# Patient Record
Sex: FEMALE | HISPANIC OR LATINO | Employment: OTHER | URBAN - NONMETROPOLITAN AREA
[De-identification: names, ages, dates, MRNs, and addresses within clinical notes are randomized per-mention and may not be internally consistent; named-entity substitution may affect disease eponyms.]

---

## 2017-10-18 ENCOUNTER — OFFICE VISIT (OUTPATIENT)
Dept: PODIATRY | Facility: CLINIC | Age: 37
End: 2017-10-18

## 2017-10-18 VITALS
SYSTOLIC BLOOD PRESSURE: 138 MMHG | OXYGEN SATURATION: 94 % | HEIGHT: 67 IN | BODY MASS INDEX: 41.28 KG/M2 | DIASTOLIC BLOOD PRESSURE: 94 MMHG | HEART RATE: 95 BPM | WEIGHT: 263 LBS

## 2017-10-18 DIAGNOSIS — M19.071 OSTEOARTHRITIS OF RIGHT ANKLE OR FOOT: ICD-10-CM

## 2017-10-18 DIAGNOSIS — Z72.0 TOBACCO ABUSE: ICD-10-CM

## 2017-10-18 DIAGNOSIS — M79.671 RIGHT FOOT PAIN: ICD-10-CM

## 2017-10-18 DIAGNOSIS — M25.374 SUBTALAR JOINT INSTABILITY, RIGHT: ICD-10-CM

## 2017-10-18 DIAGNOSIS — Z98.1 H/O ANKLE FUSION: Primary | ICD-10-CM

## 2017-10-18 DIAGNOSIS — M19.071: ICD-10-CM

## 2017-10-18 PROCEDURE — 99406 BEHAV CHNG SMOKING 3-10 MIN: CPT | Performed by: PODIATRIST

## 2017-10-18 PROCEDURE — 99214 OFFICE O/P EST MOD 30 MIN: CPT | Performed by: PODIATRIST

## 2017-10-18 PROCEDURE — 20605 DRAIN/INJ JOINT/BURSA W/O US: CPT | Performed by: PODIATRIST

## 2017-10-18 RX ORDER — GABAPENTIN 300 MG/1
300 CAPSULE ORAL 3 TIMES DAILY
Qty: 90 CAPSULE | Refills: 0 | Status: SHIPPED | OUTPATIENT
Start: 2017-10-18 | End: 2018-03-14 | Stop reason: SDUPTHER

## 2017-10-18 RX ORDER — CARISOPRODOL 350 MG/1
350 TABLET ORAL 3 TIMES DAILY PRN
Qty: 90 TABLET | Refills: 0 | Status: SHIPPED | OUTPATIENT
Start: 2017-10-18 | End: 2017-12-13 | Stop reason: SDUPTHER

## 2017-10-18 RX ORDER — BUPIVACAINE HYDROCHLORIDE 5 MG/ML
5 INJECTION, SOLUTION EPIDURAL; INTRACAUDAL ONCE
Status: COMPLETED | OUTPATIENT
Start: 2017-10-18 | End: 2017-10-18

## 2017-10-18 RX ORDER — DEXAMETHASONE SODIUM PHOSPHATE 4 MG/ML
4 INJECTION, SOLUTION INTRA-ARTICULAR; INTRALESIONAL; INTRAMUSCULAR; INTRAVENOUS; SOFT TISSUE ONCE
Status: COMPLETED | OUTPATIENT
Start: 2017-10-18 | End: 2017-10-18

## 2017-10-18 RX ADMIN — BUPIVACAINE HYDROCHLORIDE 1 ML: 5 INJECTION, SOLUTION EPIDURAL; INTRACAUDAL at 13:43

## 2017-10-18 RX ADMIN — DEXAMETHASONE SODIUM PHOSPHATE 4 MG: 4 INJECTION, SOLUTION INTRA-ARTICULAR; INTRALESIONAL; INTRAMUSCULAR; INTRAVENOUS; SOFT TISSUE at 13:43

## 2017-10-18 NOTE — PROGRESS NOTES
Karla Collins  1980  37 y.o. female     Patient presents today with a complaint of right foot/ankle pain.    10/18/2017  Chief Complaint   Patient presents with   • Right Foot - Pain           History of Present Illness    Karla Collins is a 37 y.o.female who presents to clinic today for follow-up of her right foot pain.  Patient has a complicated and significant past surgical history including right ankle arthrodesis with lateral malleolar take down.  She continues to have pain and instability to her right lower extremity.  She is currently ambulating in an AFO which does not help.  She is not taking anything for pain.  Pain is aggravated with prolonged weightbearing and relieved stress.  She denies any new trauma or injuries.  She also complains of numbness, burning and tingling to the foot.          Past Medical History:   Diagnosis Date   • Abdominal pain    • Acquired deformities     of ankle and foot   • Acute bronchitis     unspecified   • Backache    • Benign neoplasm     of skin of eyelid -EIC RLL margin   • Borderline glaucoma    • Cellulitis     right foot cellulitis   • Chronic back pain    • Chronic bronchitis    • Chronic obstructive lung disease     with asthma   • Chronic pain    • COAG (chronic open-angle glaucoma)     bleeding tests abnormal - blood clot      • Cyst of eye     eyelid   • Degenerative joint disease of ankle or foot    • Depressive disorder    • Diabetes mellitus     screening   • Difficulty walking    • Edema    • Examination     Individual health examination      • Folliculitis    • Foot drop    • Foot pain    • Foreign body of skin of lower extremity     Foreign body of skin of lower limb      • Fracture     ununited   • Gastroesophageal reflux disease    • Generalized anxiety disorder    • Hyperlipidemia    • Insomnia    • Localized primary osteoarthritis     of the ankle and /or foot   • Medication monitoring encounter    • Metatarsal bone fracture    • Morbid obesity    •  Myopia    • Obese    • Opioid dependence    • Osteoarthrosis     localized, primary, involving ankle and foot   • Otitis media    • Pain     in right foot   • Pain in limb    • Pes planus    • Postcalcaneal bursitis    • Radiculitis    • Sinus tarsi syndrome    • Spasm of back muscles    • Thromboembolism    • Tobacco dependence syndrome    • Tobacco user    • Venous thrombosis          Past Surgical History:   Procedure Laterality Date   • ABDOMINAL SURGERY  2009    L5 Vertebrectomy. Anterior fusion, L4 to sacrum. Local bone Mastercraft matrix and Infuse. Performed at [facility].)   • ANKLE SURGERY  2006    Arthrodesis of right ankle. Post traumatic degenerative joint disease    • BACK SURGERY  2009    (Posterior reduction of grade V spondylolisthesis and posteiror fusion with legacy instrumentation from L3 to the sacrum. Performed At [facility]   • BACK SURGERY      low back disk surgery x 2 most recent in 2009   •  SECTION      2003   • INJECTION OF MEDICATION  2015    kenalog(2) (Sinus tarsi syndrome)    • INJECTION OF MEDICATION  2015    Small Joint Injection/Aspiration  (Sinus tarsi syndrome   • OTHER SURGICAL HISTORY      Gastric band replacement, laparoscopic    • TUBAL ABDOMINAL LIGATION      (1)         Family History   Problem Relation Age of Onset   • Cancer Other    • Diabetes Other    • Heart disease Other    • Hypertension Other    • Stroke Other        Allergies   Allergen Reactions   • Aspirin    • Other      MRSA       Social History     Social History   • Marital status: Single     Spouse name: N/A   • Number of children: N/A   • Years of education: N/A     Occupational History   • Not on file.     Social History Main Topics   • Smoking status: Current Every Day Smoker   • Smokeless tobacco: Never Used   • Alcohol use No   • Drug use: No   • Sexual activity: Defer     Other Topics Concern   • Not on file     Social History Narrative  "        Current Outpatient Prescriptions   Medication Sig Dispense Refill   • albuterol (VENTOLIN HFA) 108 (90 BASE) MCG/ACT inhaler Inhale 2 puffs every 4 (four) hours as needed for wheezing. 8 g 11   • carisoprodol (SOMA) 250 MG tablet Take 1 tablet by mouth 3 (Three) Times a Day As Needed for muscle spasms. 30 tablet 0   • gabapentin (NEURONTIN) 300 MG capsule Take one tab morning and afternoon. Take 2 tabs before bedtime. 90 capsule 0   • mupirocin (BACTROBAN) 2 % cream Apply  topically 3 (three) times a day. 15 g 1   • carisoprodol (SOMA) 350 MG tablet Take 1 tablet by mouth 3 (Three) Times a Day As Needed for Muscle Spasms. 90 tablet 0   • gabapentin (NEURONTIN) 300 MG capsule Take 1 capsule by mouth 3 (Three) Times a Day. 90 capsule 0     Current Facility-Administered Medications   Medication Dose Route Frequency Provider Last Rate Last Dose   • bupivacaine (PF) (MARCAINE) 0.5 % injection 5 mL  5 mL Injection Once Paul Greco DPM       • dexamethasone (DECADRON) injection 4 mg  4 mg Intravenous Once Paul Greco DPM             OBJECTIVE    /94  Pulse 95  Ht 67\" (170.2 cm)  Wt 263 lb (119 kg)  SpO2 94%  BMI 41.19 kg/m2      Review of Systems   Constitutional: Negative for fatigue and fever.   HENT: Negative for hearing loss and nosebleeds.    Respiratory: Negative for cough, chest tightness and shortness of breath.    Cardiovascular: Negative for chest pain and leg swelling.   Gastrointestinal: Negative for diarrhea, nausea and vomiting.   Musculoskeletal:        Right lower extremity pain and instability     Skin: Negative.    Neurological: Positive for numbness. Negative for headaches.         Constitutional: well developed, well nourished    HEENT: Normocephalic and atraumatic, normal hearing    Respiratory: Non labored respirations noted    Cardiovascular:    DP/PT pulses palpable    CFT brisk  to all digits  Skin temp is warm to warm from proximal tibia to distal digits  Pedal hair " growth present.     Musculoskeletal:  Anterior muscle group strength 3/5. Posterior group 4/5  Gait abducted, apropulsive  Semi-rigid hammertoe deformity toes 2-5 on right  Absent ROM on right ankle  Severe valgus of hindfoot with subluxation of STJ and TN  Tenderness with STJ ROM  Absent PTT function    Dermatological:   Skin is warm, dry and intact    No subcutaneous nodules or masses noted    No open wounds noted     Neurological:   Sensation intact to light touch    DTR intact    Psychiatric: A&O x 3 with normal mood and affect. NAD.         STJ injection  Date/Time: 10/18/2017 5:16 PM  Consent given by: patient  Site marked: site marked  Timeout: Immediately prior to procedure a time out was called to verify the correct patient, procedure, equipment, support staff and site/side marked as required   Supporting Documentation  Indications: pain   Procedure Details  Location: right STJ.  Preparation: Patient was prepped and draped in the usual sterile fashion  Needle size: 25 G  Approach: anterolateral  Medication group details: 0.5% marcaine plain and decadron 4mg/mL.  Patient tolerance: patient tolerated the procedure well with no immediate complications              ASSESSMENT AND PLAN    Karla was seen today for pain.    Diagnoses and all orders for this visit:    H/O ankle fusion    Subtalar joint instability, right    Degenerative joint disease, ankle, foot, toe, right  -     bupivacaine (PF) (MARCAINE) 0.5 % injection 5 mL; Inject 5 mL as directed 1 (One) Time.  -     dexamethasone (DECADRON) injection 4 mg; Infuse 1 mL into a venous catheter 1 (One) Time.    Right foot pain    Osteoarthritis of right ankle or foot    Other orders  -     gabapentin (NEURONTIN) 300 MG capsule; Take 1 capsule by mouth 3 (Three) Times a Day.  -     carisoprodol (SOMA) 350 MG tablet; Take 1 tablet by mouth 3 (Three) Times a Day As Needed for Muscle Spasms.    - Comprehensive foot and ankle exam performed.   - Lengthy discussion  was held patient regarding her deformity and potential options for treatment.  Advised patient that she would have to stop smoking prior to consideration of surgical intervention.  She states understanding of this.  - We'll review all medical records in detail prior to next visit. Surgical plan will likely be for pantalar arthrodesis.   - Rx for Neurontin and Soma  - steroid injection for STJ  - I spent 5 minutes counseling this patient on tobacco cessation and the importance  - All questions were answered to the patients satisfaction.  - RTC in one month after cessation of smoking          This document has been electronically signed by Paul Greco DPM on October 18, 2017 5:09 PM     10/18/2017  5:09 PM

## 2017-12-04 ENCOUNTER — OFFICE VISIT (OUTPATIENT)
Dept: PODIATRY | Facility: CLINIC | Age: 37
End: 2017-12-04

## 2017-12-04 VITALS — HEART RATE: 85 BPM | HEIGHT: 67 IN | BODY MASS INDEX: 45.52 KG/M2 | WEIGHT: 290 LBS | OXYGEN SATURATION: 95 %

## 2017-12-04 DIAGNOSIS — Z98.1 H/O ANKLE FUSION: Primary | ICD-10-CM

## 2017-12-04 DIAGNOSIS — M19.071: ICD-10-CM

## 2017-12-04 DIAGNOSIS — M25.374 SUBTALAR JOINT INSTABILITY, RIGHT: ICD-10-CM

## 2017-12-04 PROCEDURE — 99214 OFFICE O/P EST MOD 30 MIN: CPT | Performed by: PODIATRIST

## 2017-12-04 NOTE — PROGRESS NOTES
Karla Collins  1980  37 y.o. female     Patient presents today for recheck of her right foot/ankle pain.    12/4/2017  Chief Complaint   Patient presents with   • Right Foot - Follow-up           History of Present Illness    Patient presents to clinic today for follow-up of her right foot and ankle deformity.  She had steroid injection in the subtalar joint on her last visit.  She states that it provided her with relief.  Patient relates that she hasn't smoked in 43 days.  She has no new pedal complaints today.          Past Medical History:   Diagnosis Date   • Abdominal pain    • Acquired deformities     of ankle and foot   • Acute bronchitis     unspecified   • Backache    • Benign neoplasm     of skin of eyelid -EIC RLL margin   • Borderline glaucoma    • Cellulitis     right foot cellulitis   • Chronic back pain    • Chronic bronchitis    • Chronic obstructive lung disease     with asthma   • Chronic pain    • COAG (chronic open-angle glaucoma)     bleeding tests abnormal - blood clot      • Cyst of eye     eyelid   • Degenerative joint disease of ankle or foot    • Depressive disorder    • Diabetes mellitus     screening   • Difficulty walking    • Edema    • Examination     Individual health examination      • Folliculitis    • Foot drop    • Foot pain    • Foreign body of skin of lower extremity     Foreign body of skin of lower limb      • Fracture     ununited   • Gastroesophageal reflux disease    • Generalized anxiety disorder    • Hyperlipidemia    • Insomnia    • Localized primary osteoarthritis     of the ankle and /or foot   • Medication monitoring encounter    • Metatarsal bone fracture    • Morbid obesity    • Myopia    • Obese    • Opioid dependence    • Osteoarthrosis     localized, primary, involving ankle and foot   • Otitis media    • Pain     in right foot   • Pain in limb    • Pes planus    • Postcalcaneal bursitis    • Radiculitis    • Sinus tarsi syndrome    • Spasm of back muscles     • Thromboembolism    • Tobacco dependence syndrome    • Tobacco user    • Venous thrombosis          Past Surgical History:   Procedure Laterality Date   • ABDOMINAL SURGERY  2009    L5 Vertebrectomy. Anterior fusion, L4 to sacrum. Local bone Mastercraft matrix and Infuse. Performed at [facility].)   • ANKLE SURGERY  2006    Arthrodesis of right ankle. Post traumatic degenerative joint disease    • BACK SURGERY  2009    (Posterior reduction of grade V spondylolisthesis and posteiror fusion with legacy instrumentation from L3 to the sacrum. Performed At [facility]   • BACK SURGERY      low back disk surgery x 2 most recent in 2009   •  SECTION      2003   • INJECTION OF MEDICATION  2015    kenalog(2) (Sinus tarsi syndrome)    • INJECTION OF MEDICATION  2015    Small Joint Injection/Aspiration  (Sinus tarsi syndrome   • OTHER SURGICAL HISTORY      Gastric band replacement, laparoscopic    • TUBAL ABDOMINAL LIGATION      (1)         Family History   Problem Relation Age of Onset   • Cancer Other    • Diabetes Other    • Heart disease Other    • Hypertension Other    • Stroke Other        Allergies   Allergen Reactions   • Aspirin    • Other      MRSA       Social History     Social History   • Marital status: Single     Spouse name: N/A   • Number of children: N/A   • Years of education: N/A     Occupational History   • Not on file.     Social History Main Topics   • Smoking status: Former Smoker   • Smokeless tobacco: Never Used   • Alcohol use No   • Drug use: No   • Sexual activity: Defer     Other Topics Concern   • Not on file     Social History Narrative         Current Outpatient Prescriptions   Medication Sig Dispense Refill   • albuterol (VENTOLIN HFA) 108 (90 BASE) MCG/ACT inhaler Inhale 2 puffs every 4 (four) hours as needed for wheezing. 8 g 11   • carisoprodol (SOMA) 250 MG tablet Take 1 tablet by mouth 3 (Three) Times a Day As Needed for  "muscle spasms. 30 tablet 0   • carisoprodol (SOMA) 350 MG tablet Take 1 tablet by mouth 3 (Three) Times a Day As Needed for Muscle Spasms. 90 tablet 0   • gabapentin (NEURONTIN) 300 MG capsule Take one tab morning and afternoon. Take 2 tabs before bedtime. 90 capsule 0   • gabapentin (NEURONTIN) 300 MG capsule Take 1 capsule by mouth 3 (Three) Times a Day. 90 capsule 0   • mupirocin (BACTROBAN) 2 % cream Apply  topically 3 (three) times a day. 15 g 1     No current facility-administered medications for this visit.          OBJECTIVE    Pulse 85  Ht 67\" (170.2 cm)  Wt 290 lb (132 kg)  SpO2 95%  BMI 45.42 kg/m2      Review of Systems   Constitutional: Negative for fatigue and fever.   Eyes: Negative.    Respiratory: Negative for cough, chest tightness and shortness of breath.    Cardiovascular: Negative for chest pain and leg swelling.   Gastrointestinal: Negative for diarrhea, nausea and vomiting.   Musculoskeletal:        Right lower extremity pain and instability     Skin: Negative.    Neurological: Positive for numbness. Negative for headaches.   Psychiatric/Behavioral: Negative.          Constitutional: well developed, well nourished    HEENT: Normocephalic and atraumatic, normal hearing    Respiratory: Non labored respirations noted    Cardiovascular:    DP/PT pulses palpable    CFT brisk  to all digits  Skin temp is warm to warm from proximal tibia to distal digits  Pedal hair growth present.     Musculoskeletal:  Anterior muscle group strength 3/5. Posterior group 4/5  Gait abducted, apropulsive  Semi-rigid hammertoe deformity toes 2-5 on right  Absent ROM on right ankle  Severe valgus of hindfoot with subluxation of STJ and TN  Tenderness with STJ ROM  Absent PTT function    Dermatological:   Skin is warm, dry and intact    No subcutaneous nodules or masses noted    No open wounds noted     Neurological:   Sensation intact to light touch    DTR intact    Psychiatric: A&O x 3 with normal mood and affect. " NAD.         Procedures        ASSESSMENT AND PLAN    Karla was seen today for follow-up.    Diagnoses and all orders for this visit:    H/O ankle fusion    Subtalar joint instability, right    Degenerative joint disease, ankle, foot, toe, right  -     CT ANKLE RIGHT WITHOUT CONTRAST; Future    - Ordered CT scan right ankle for surgical planning  - All questions were answered to the patients satisfaction.  - RTC after CT scan      I spent 30 minutes face-to-face with this patient discussing her pedal deformity and treatment.          This document has been electronically signed by Paul Greco DPM on December 4, 2017 2:24 PM     12/4/2017  2:24 PM

## 2017-12-11 ENCOUNTER — HOSPITAL ENCOUNTER (OUTPATIENT)
Dept: CT IMAGING | Facility: HOSPITAL | Age: 37
Discharge: HOME | End: 2017-12-11
Attending: PODIATRIST | Admitting: PODIATRIST

## 2017-12-11 DIAGNOSIS — M19.071: ICD-10-CM

## 2017-12-11 PROCEDURE — 73700 CT LOWER EXTREMITY W/O DYE: CPT

## 2017-12-13 ENCOUNTER — OFFICE VISIT (OUTPATIENT)
Dept: PODIATRY | Facility: CLINIC | Age: 37
End: 2017-12-13

## 2017-12-13 VITALS — HEART RATE: 89 BPM | WEIGHT: 290 LBS | BODY MASS INDEX: 45.52 KG/M2 | HEIGHT: 67 IN | OXYGEN SATURATION: 98 %

## 2017-12-13 DIAGNOSIS — M19.071: ICD-10-CM

## 2017-12-13 DIAGNOSIS — M25.374 SUBTALAR JOINT INSTABILITY, RIGHT: ICD-10-CM

## 2017-12-13 DIAGNOSIS — Z98.1 H/O ANKLE FUSION: Primary | ICD-10-CM

## 2017-12-13 PROCEDURE — 99213 OFFICE O/P EST LOW 20 MIN: CPT | Performed by: PODIATRIST

## 2017-12-13 RX ORDER — CARISOPRODOL 350 MG/1
350 TABLET ORAL 3 TIMES DAILY PRN
Qty: 90 TABLET | Refills: 0 | Status: SHIPPED | OUTPATIENT
Start: 2017-12-13 | End: 2018-03-14 | Stop reason: SDUPTHER

## 2017-12-13 RX ORDER — GABAPENTIN 300 MG/1
CAPSULE ORAL
Qty: 90 CAPSULE | Refills: 0 | Status: SHIPPED | OUTPATIENT
Start: 2017-12-13 | End: 2018-06-18 | Stop reason: SDUPTHER

## 2017-12-13 NOTE — PROGRESS NOTES
Karla Collins  1980  37 y.o. female     Patient presents today for recheck of her right foot/ankle pain and CT results.    12/13/2017  Chief Complaint   Patient presents with   • Right Ankle - Follow-up, CT results           History of Present Illness    Patient presents to clinic today for follow-up of her right foot and ankle deformity.  She Has had her CT scan.  She continues to complain of pain and instability in the right foot and ankle despite the use of an AFO.  She now states that the current AFO that she is using is rubbing in 2 different places.  She has no new pedal complaints.        Past Medical History:   Diagnosis Date   • Abdominal pain    • Acquired deformities     of ankle and foot   • Acute bronchitis     unspecified   • Backache    • Benign neoplasm     of skin of eyelid -EIC RLL margin   • Borderline glaucoma    • Cellulitis     right foot cellulitis   • Chronic back pain    • Chronic bronchitis    • Chronic obstructive lung disease     with asthma   • Chronic pain    • COAG (chronic open-angle glaucoma)     bleeding tests abnormal - blood clot      • Cyst of eye     eyelid   • Degenerative joint disease of ankle or foot    • Depressive disorder    • Diabetes mellitus     screening   • Difficulty walking    • Edema    • Examination     Individual health examination      • Folliculitis    • Foot drop    • Foot pain    • Foreign body of skin of lower extremity     Foreign body of skin of lower limb      • Fracture     ununited   • Gastroesophageal reflux disease    • Generalized anxiety disorder    • Hyperlipidemia    • Insomnia    • Localized primary osteoarthritis     of the ankle and /or foot   • Medication monitoring encounter    • Metatarsal bone fracture    • Morbid obesity    • Myopia    • Obese    • Opioid dependence    • Osteoarthrosis     localized, primary, involving ankle and foot   • Otitis media    • Pain     in right foot   • Pain in limb    • Pes planus    • Postcalcaneal  bursitis    • Radiculitis    • Sinus tarsi syndrome    • Spasm of back muscles    • Thromboembolism    • Tobacco dependence syndrome    • Tobacco user    • Venous thrombosis          Past Surgical History:   Procedure Laterality Date   • ABDOMINAL SURGERY  2009    L5 Vertebrectomy. Anterior fusion, L4 to sacrum. Local bone Mastercraft matrix and Infuse. Performed at [facility].)   • ANKLE SURGERY  2006    Arthrodesis of right ankle. Post traumatic degenerative joint disease    • BACK SURGERY  2009    (Posterior reduction of grade V spondylolisthesis and posteiror fusion with legacy instrumentation from L3 to the sacrum. Performed At [facility]   • BACK SURGERY      low back disk surgery x 2 most recent in 2009   •  SECTION      2003   • INJECTION OF MEDICATION  2015    kenalog(2) (Sinus tarsi syndrome)    • INJECTION OF MEDICATION  2015    Small Joint Injection/Aspiration  (Sinus tarsi syndrome   • OTHER SURGICAL HISTORY      Gastric band replacement, laparoscopic    • TUBAL ABDOMINAL LIGATION      (1)         Family History   Problem Relation Age of Onset   • Cancer Other    • Diabetes Other    • Heart disease Other    • Hypertension Other    • Stroke Other        Allergies   Allergen Reactions   • Aspirin    • Other      MRSA       Social History     Social History   • Marital status: Single     Spouse name: N/A   • Number of children: N/A   • Years of education: N/A     Occupational History   • Not on file.     Social History Main Topics   • Smoking status: Former Smoker   • Smokeless tobacco: Never Used   • Alcohol use No   • Drug use: No   • Sexual activity: Defer     Other Topics Concern   • Not on file     Social History Narrative         Current Outpatient Prescriptions   Medication Sig Dispense Refill   • albuterol (VENTOLIN HFA) 108 (90 BASE) MCG/ACT inhaler Inhale 2 puffs every 4 (four) hours as needed for wheezing. 8 g 11   • carisoprodol  "(SOMA) 350 MG tablet Take 1 tablet by mouth 3 (Three) Times a Day As Needed for Muscle Spasms. 90 tablet 0   • gabapentin (NEURONTIN) 300 MG capsule Take 1 capsule by mouth 3 (Three) Times a Day. 90 capsule 0   • gabapentin (NEURONTIN) 300 MG capsule Take one tab morning and afternoon. Take 2 tabs before bedtime. 90 capsule 0   • mupirocin (BACTROBAN) 2 % cream Apply  topically 3 (three) times a day. 15 g 1     No current facility-administered medications for this visit.          OBJECTIVE    Pulse 89  Ht 170.2 cm (67\")  Wt 132 kg (290 lb)  SpO2 98%  BMI 45.42 kg/m2      Review of Systems   Constitutional: Negative for fatigue and fever.   Eyes: Negative.    Respiratory: Negative for cough, chest tightness and shortness of breath.    Cardiovascular: Negative for chest pain and leg swelling.   Gastrointestinal: Negative for diarrhea, nausea and vomiting.   Musculoskeletal:        Right lower extremity pain and instability     Skin: Negative.    Neurological: Positive for numbness. Negative for headaches.   Psychiatric/Behavioral:        Depression         Constitutional: well developed, well nourished    HEENT: Normocephalic and atraumatic, normal hearing    Respiratory: Non labored respirations noted    Cardiovascular:    DP/PT pulses palpable    CFT brisk  to all digits  Skin temp is warm to warm from proximal tibia to distal digits  Pedal hair growth present.     Musculoskeletal:  Anterior muscle group strength 3/5. Posterior group 4/5  Gait abducted, apropulsive  Semi-rigid hammertoe deformity toes 2-5 on right  Absent ROM on right ankle  Severe valgus of hindfoot with subluxation of STJ and TN  Tenderness with STJ ROM  Absent PTT function    Dermatological:   Skin is warm, dry and intact    No subcutaneous nodules or masses noted    No open wounds noted   Abrasions noted to the medial ankle and plantar forefoot    Neurological:   Sensation intact to light touch    DTR intact    Psychiatric: A&O x 3 with " normal mood and affect. NAD.         Procedures        ASSESSMENT AND PLAN    Karla was seen today for follow-up and ct results.    Diagnoses and all orders for this visit:    H/O ankle fusion    Subtalar joint instability, right    Degenerative joint disease, ankle, foot, toe, right    Other orders  -     gabapentin (NEURONTIN) 300 MG capsule; Take one tab morning and afternoon. Take 2 tabs before bedtime.  -     carisoprodol (SOMA) 350 MG tablet; Take 1 tablet by mouth 3 (Three) Times a Day As Needed for Muscle Spasms.    - CT scan reviewed.  - Rx for [place] brace.  Current AFO that the patient has is insufficient for her deformity.  It is not providing adequate stability.  It is also rubbing in 2 different locations.  - All questions were answered to the patients satisfaction.  - RTC after obtaining new brace               This document has been electronically signed by Paul Greco DPM on December 13, 2017 4:31 PM     12/13/2017  4:31 PM

## 2018-03-14 RX ORDER — GABAPENTIN 300 MG/1
300 CAPSULE ORAL 3 TIMES DAILY
Qty: 90 CAPSULE | Refills: 0 | Status: SHIPPED | OUTPATIENT
Start: 2018-03-14 | End: 2018-05-21 | Stop reason: SDUPTHER

## 2018-03-14 RX ORDER — CARISOPRODOL 350 MG/1
350 TABLET ORAL 3 TIMES DAILY PRN
Qty: 90 TABLET | Refills: 0 | Status: SHIPPED | OUTPATIENT
Start: 2018-03-14 | End: 2018-05-21 | Stop reason: SDUPTHER

## 2018-05-15 ENCOUNTER — TELEPHONE (OUTPATIENT)
Dept: PODIATRY | Facility: CLINIC | Age: 38
End: 2018-05-15

## 2018-05-15 NOTE — TELEPHONE ENCOUNTER
Patient called and needs a refill gabapentin (NEURONTIN) 300 MG capsule     And a refill on carisoprodol (SOMA) 350 MG tablet     Said she takes them as needed not as prescribed.   And the last time she was seen is 12/13/2017

## 2018-05-16 NOTE — TELEPHONE ENCOUNTER
I can write once more. If patient needs to be on these medications long term then we will have to refer her to pain management.

## 2018-05-21 RX ORDER — GABAPENTIN 300 MG/1
300 CAPSULE ORAL 3 TIMES DAILY
Qty: 90 CAPSULE | Refills: 0 | Status: SHIPPED | OUTPATIENT
Start: 2018-05-21 | End: 2018-06-18 | Stop reason: SDUPTHER

## 2018-05-21 RX ORDER — CARISOPRODOL 350 MG/1
350 TABLET ORAL 3 TIMES DAILY PRN
Qty: 90 TABLET | Refills: 0 | Status: SHIPPED | OUTPATIENT
Start: 2018-05-21 | End: 2018-06-18 | Stop reason: SDUPTHER

## 2018-06-18 ENCOUNTER — OFFICE VISIT (OUTPATIENT)
Dept: PODIATRY | Facility: CLINIC | Age: 38
End: 2018-06-18

## 2018-06-18 VITALS — BODY MASS INDEX: 45.67 KG/M2 | HEIGHT: 67 IN | WEIGHT: 291.01 LBS

## 2018-06-18 DIAGNOSIS — M19.071 OSTEOARTHRITIS OF RIGHT ANKLE OR FOOT: Primary | ICD-10-CM

## 2018-06-18 DIAGNOSIS — M25.374 SUBTALAR JOINT INSTABILITY, RIGHT: ICD-10-CM

## 2018-06-18 PROCEDURE — 20605 DRAIN/INJ JOINT/BURSA W/O US: CPT | Performed by: PODIATRIST

## 2018-06-18 RX ORDER — DEXAMETHASONE SODIUM PHOSPHATE 4 MG/ML
4 INJECTION, SOLUTION INTRA-ARTICULAR; INTRALESIONAL; INTRAMUSCULAR; INTRAVENOUS; SOFT TISSUE ONCE
Status: COMPLETED | OUTPATIENT
Start: 2018-06-18 | End: 2018-06-18

## 2018-06-18 RX ORDER — BUPIVACAINE HYDROCHLORIDE 5 MG/ML
1 INJECTION, SOLUTION EPIDURAL; INTRACAUDAL ONCE
Status: COMPLETED | OUTPATIENT
Start: 2018-06-18 | End: 2018-06-18

## 2018-06-18 RX ORDER — CARISOPRODOL 350 MG/1
350 TABLET ORAL 3 TIMES DAILY PRN
Qty: 90 TABLET | Refills: 2 | Status: SHIPPED | OUTPATIENT
Start: 2018-06-18

## 2018-06-18 RX ORDER — GABAPENTIN 300 MG/1
300 CAPSULE ORAL 3 TIMES DAILY
Qty: 90 CAPSULE | Refills: 2 | Status: SHIPPED | OUTPATIENT
Start: 2018-06-18 | End: 2021-11-08

## 2018-06-18 RX ADMIN — BUPIVACAINE HYDROCHLORIDE 1 ML: 5 INJECTION, SOLUTION EPIDURAL; INTRACAUDAL at 10:53

## 2018-06-18 RX ADMIN — DEXAMETHASONE SODIUM PHOSPHATE 4 MG: 4 INJECTION, SOLUTION INTRA-ARTICULAR; INTRALESIONAL; INTRAMUSCULAR; INTRAVENOUS; SOFT TISSUE at 10:53

## 2018-06-18 NOTE — PROGRESS NOTES
Karla Collins  1980  37 y.o. female     Patient presents today for recheck of her right foot/ankle pain.    6/18/2018  Chief Complaint   Patient presents with   • Right Ankle - Follow-up       History of Present Illness    Patient presents to clinic today for follow-up of her right foot and ankle deformity.  She has had her new braces made.  However, she has to have the modified because they are uncomfortable.  She continues to complain of pain and instability to the right lower extremity.  She rates her pain as a 5 out of 10.  Patient states that she does not want to go to pain management for fear of them turning her into an addict.  She would prefer that her primary care doctor provided her prescriptions.  However, her primary care provider cannot write them at this time.  She'll be able to write them in September of this year.  She has no new pedal complaints.        Past Medical History:   Diagnosis Date   • Abdominal pain    • Acquired deformities     of ankle and foot   • Acute bronchitis     unspecified   • Backache    • Benign neoplasm     of skin of eyelid -EIC RLL margin   • Borderline glaucoma    • Cellulitis     right foot cellulitis   • Chronic back pain    • Chronic bronchitis    • Chronic obstructive lung disease     with asthma   • Chronic pain    • COAG (chronic open-angle glaucoma)     bleeding tests abnormal - blood clot      • Cyst of eye     eyelid   • Degenerative joint disease of ankle or foot    • Depressive disorder    • Diabetes mellitus     screening   • Difficulty walking    • Edema    • Examination     Individual health examination      • Folliculitis    • Foot drop    • Foot pain    • Foreign body of skin of lower extremity     Foreign body of skin of lower limb      • Fracture     ununited   • Gastroesophageal reflux disease    • Generalized anxiety disorder    • Hyperlipidemia    • Insomnia    • Localized primary osteoarthritis     of the ankle and /or foot   • Medication  monitoring encounter    • Metatarsal bone fracture    • Morbid obesity    • Myopia    • Obese    • Opioid dependence    • Osteoarthrosis     localized, primary, involving ankle and foot   • Otitis media    • Pain     in right foot   • Pain in limb    • Pes planus    • Postcalcaneal bursitis    • Radiculitis    • Sinus tarsi syndrome    • Spasm of back muscles    • Thromboembolism    • Tobacco dependence syndrome    • Tobacco user    • Venous thrombosis          Past Surgical History:   Procedure Laterality Date   • ABDOMINAL SURGERY  2009    L5 Vertebrectomy. Anterior fusion, L4 to sacrum. Local bone Mastercraft matrix and Infuse. Performed at [facility].)   • ANKLE SURGERY  2006    Arthrodesis of right ankle. Post traumatic degenerative joint disease    • BACK SURGERY  2009    (Posterior reduction of grade V spondylolisthesis and posteiror fusion with legacy instrumentation from L3 to the sacrum. Performed At [facility]   • BACK SURGERY      low back disk surgery x 2 most recent in 2009   •  SECTION      2003   • INJECTION OF MEDICATION  2015    kenalog(2) (Sinus tarsi syndrome)    • INJECTION OF MEDICATION  2015    Small Joint Injection/Aspiration  (Sinus tarsi syndrome   • OTHER SURGICAL HISTORY      Gastric band replacement, laparoscopic    • TUBAL ABDOMINAL LIGATION      (1)         Family History   Problem Relation Age of Onset   • Cancer Other    • Diabetes Other    • Heart disease Other    • Hypertension Other    • Stroke Other        Allergies   Allergen Reactions   • Aspirin    • Other      MRSA       Social History     Social History   • Marital status: Single     Spouse name: N/A   • Number of children: N/A   • Years of education: N/A     Occupational History   • Not on file.     Social History Main Topics   • Smoking status: Former Smoker   • Smokeless tobacco: Never Used   • Alcohol use No   • Drug use: No   • Sexual activity: Defer     Other  "Topics Concern   • Not on file     Social History Narrative   • No narrative on file         Current Outpatient Prescriptions   Medication Sig Dispense Refill   • albuterol (VENTOLIN HFA) 108 (90 BASE) MCG/ACT inhaler Inhale 2 puffs every 4 (four) hours as needed for wheezing. 8 g 11   • carisoprodol (SOMA) 350 MG tablet Take 1 tablet by mouth 3 (Three) Times a Day As Needed for Muscle Spasms. 90 tablet 2   • gabapentin (NEURONTIN) 300 MG capsule Take 1 capsule by mouth 3 (Three) Times a Day. 90 capsule 2   • mupirocin (BACTROBAN) 2 % cream Apply  topically 3 (three) times a day. 15 g 1     Current Facility-Administered Medications   Medication Dose Route Frequency Provider Last Rate Last Dose   • bupivacaine (PF) (MARCAINE) 0.5 % injection 1 mL  1 mL Injection Once Paul Greco DPM       • dexamethasone sodium phosphate injection 4 mg  4 mg Intramuscular Once Paul Greco DPM             OBJECTIVE    Ht 170.2 cm (67.01\")   Wt 132 kg (291 lb 0.1 oz)   BMI 45.57 kg/m²       Review of Systems   Constitutional: Negative for fatigue and fever.   Eyes: Negative.    Respiratory: Negative for cough, chest tightness and shortness of breath.    Cardiovascular: Negative for chest pain and leg swelling.   Gastrointestinal: Negative for diarrhea, nausea and vomiting.   Musculoskeletal:        Right lower extremity pain and instability     Skin: Negative.    Neurological: Positive for numbness. Negative for headaches.   Psychiatric/Behavioral:        Depression         Constitutional: well developed, well nourished    HEENT: Normocephalic and atraumatic, normal hearing    Respiratory: Non labored respirations noted    Cardiovascular:    DP/PT pulses palpable    CFT brisk  to all digits  Skin temp is warm to warm from proximal tibia to distal digits  Pedal hair growth present.     Musculoskeletal:  Anterior muscle group strength 3/5. Posterior group 4/5  Gait abducted, apropulsive  Semi-rigid hammertoe deformity toes 2-5 " on right  Absent ROM on right ankle  Severe valgus of hindfoot with subluxation of STJ and TN  Tenderness with STJ ROM  Absent PTT function    Dermatological:   Skin is warm, dry and intact    No subcutaneous nodules or masses noted    No open wounds noted   Abrasions noted to the medial ankle and plantar forefoot    Neurological:   Sensation intact to light touch    DTR intact    Psychiatric: A&O x 3 with normal mood and affect. NAD.         Procedures    STJ injection  Date/Time: 06/18/2018   Consent given by: patient  Site marked: site marked  Timeout: Immediately prior to procedure a time out was called to verify the correct patient, procedure, equipment, support staff and site/side marked as required   Supporting Documentation  Indications: pain   Procedure Details  Location: right STJ.  Preparation: Patient was prepped and draped in the usual sterile fashion  Needle size: 25 G  Approach: anterolateral  Medication group details: 0.5% marcaine plain and decadron 4mg/mL.  Patient tolerance: patient tolerated the procedure well with no immediate complications    ASSESSMENT AND PLAN    Karla was seen today for follow-up.    Diagnoses and all orders for this visit:    Osteoarthritis of right ankle or foot  -     bupivacaine (PF) (MARCAINE) 0.5 % injection 1 mL; Inject 1 mL as directed 1 (One) Time.  -     dexamethasone sodium phosphate injection 4 mg; Inject 1 mL into the shoulder, thigh, or buttocks 1 (One) Time.    Subtalar joint instability, right    Other orders  -     gabapentin (NEURONTIN) 300 MG capsule; Take 1 capsule by mouth 3 (Three) Times a Day.  -     carisoprodol (SOMA) 350 MG tablet; Take 1 tablet by mouth 3 (Three) Times a Day As Needed for Muscle Spasms.      - Steroid injection left STJ  - Refilled neruontin and soma until patients primary care provider take over  - All questions were answered to the patients satisfaction.  - RTC 6 months               This document has been electronically signed  by Paul Greco DPM on June 18, 2018 11:33 AM     6/18/2018  11:33 AM

## 2018-07-24 DIAGNOSIS — M54.5 LOW BACK PAIN, UNSPECIFIED BACK PAIN LATERALITY, UNSPECIFIED CHRONICITY, WITH SCIATICA PRESENCE UNSPECIFIED: Primary | ICD-10-CM

## 2018-07-25 ENCOUNTER — OFFICE VISIT (OUTPATIENT)
Dept: ORTHOPEDIC SURGERY | Facility: CLINIC | Age: 38
End: 2018-07-25

## 2018-07-25 VITALS — WEIGHT: 262 LBS | HEIGHT: 67 IN | BODY MASS INDEX: 41.12 KG/M2

## 2018-07-25 DIAGNOSIS — M54.5 LOW BACK PAIN, UNSPECIFIED BACK PAIN LATERALITY, UNSPECIFIED CHRONICITY, WITH SCIATICA PRESENCE UNSPECIFIED: Primary | ICD-10-CM

## 2018-07-25 PROCEDURE — 99213 OFFICE O/P EST LOW 20 MIN: CPT | Performed by: ORTHOPAEDIC SURGERY

## 2018-07-25 RX ORDER — ERGOCALCIFEROL 1.25 MG/1
50000 CAPSULE ORAL WEEKLY
COMMUNITY
End: 2021-11-08

## 2019-02-27 ENCOUNTER — OFFICE VISIT (OUTPATIENT)
Dept: PODIATRY | Facility: CLINIC | Age: 39
End: 2019-02-27

## 2019-02-27 VITALS — WEIGHT: 250 LBS | BODY MASS INDEX: 39.24 KG/M2 | HEART RATE: 101 BPM | OXYGEN SATURATION: 98 % | HEIGHT: 67 IN

## 2019-02-27 DIAGNOSIS — M25.374 SUBTALAR JOINT INSTABILITY, RIGHT: Primary | ICD-10-CM

## 2019-02-27 DIAGNOSIS — M19.071 OSTEOARTHRITIS OF RIGHT ANKLE OR FOOT: ICD-10-CM

## 2019-02-27 DIAGNOSIS — Z98.1 H/O ANKLE FUSION: ICD-10-CM

## 2019-02-27 DIAGNOSIS — M19.071: ICD-10-CM

## 2019-02-27 DIAGNOSIS — M79.671 RIGHT FOOT PAIN: ICD-10-CM

## 2019-02-27 PROCEDURE — 99213 OFFICE O/P EST LOW 20 MIN: CPT | Performed by: PODIATRIST

## 2019-02-27 NOTE — PROGRESS NOTES
Karla Collins  1980  38 y.o. female     Patient presents today with a complaint of bilateral ankle pain.    2/28/2019  Chief Complaint   Patient presents with   • Left Ankle - Follow-up, Pain   • Right Ankle - Follow-up, Pain       History of Present Illness    Patient presents to clinic today for follow-up of her right foot and ankle deformity.  She is ambulating in AFOs. She continues to complain of pain and instability to the right lower extremity.  She rates her pain as a 6 out of 10.  Patient states that her pain is somewhat controlled on herbal medications.  She has no new pedal complaints.        Past Medical History:   Diagnosis Date   • Abdominal pain    • Acquired deformities     of ankle and foot   • Acute bronchitis     unspecified   • Backache    • Benign neoplasm     of skin of eyelid -EIC RLL margin   • Borderline glaucoma    • Cellulitis     right foot cellulitis   • Chronic back pain    • Chronic bronchitis (CMS/HCC)    • Chronic obstructive lung disease (CMS/HCC)     with asthma   • Chronic pain    • COAG (chronic open-angle glaucoma)     bleeding tests abnormal - blood clot      • Cyst of eye     eyelid   • Degenerative joint disease of ankle or foot    • Depressive disorder    • Diabetes mellitus (CMS/HCC)     screening   • Difficulty walking    • Edema    • Examination     Individual health examination      • Folliculitis    • Foot drop    • Foot pain    • Foreign body of skin of lower extremity     Foreign body of skin of lower limb      • Fracture     ununited   • Gastroesophageal reflux disease    • Generalized anxiety disorder    • Hyperlipidemia    • Insomnia    • Localized primary osteoarthritis     of the ankle and /or foot   • Medication monitoring encounter    • Metatarsal bone fracture    • Morbid obesity (CMS/HCC)    • Myopia    • Obese    • Opioid dependence (CMS/HCC)    • Osteoarthrosis     localized, primary, involving ankle and foot   • Otitis media    • Pain     in right foot    • Pain in limb    • Pes planus    • Postcalcaneal bursitis    • Radiculitis    • Sinus tarsi syndrome    • Spasm of back muscles    • Thromboembolism (CMS/HCC)    • Tobacco dependence syndrome    • Tobacco user    • Venous thrombosis          Past Surgical History:   Procedure Laterality Date   • ABDOMINAL SURGERY  2009    L5 Vertebrectomy. Anterior fusion, L4 to sacrum. Local bone Mastercraft matrix and Infuse. Performed at [facility].)   • ANKLE SURGERY  2006    Arthrodesis of right ankle. Post traumatic degenerative joint disease    • BACK SURGERY  2009    (Posterior reduction of grade V spondylolisthesis and posteiror fusion with legacy instrumentation from L3 to the sacrum. Performed At [facility]   • BACK SURGERY      low back disk surgery x 2 most recent in 2009   •  SECTION      2003   • INJECTION OF MEDICATION  2015    kenalog(2) (Sinus tarsi syndrome)    • INJECTION OF MEDICATION  2015    Small Joint Injection/Aspiration  (Sinus tarsi syndrome   • OTHER SURGICAL HISTORY      Gastric band replacement, laparoscopic    • TUBAL ABDOMINAL LIGATION      (1)         Family History   Problem Relation Age of Onset   • Cancer Other    • Diabetes Other    • Heart disease Other    • Hypertension Other    • Stroke Other        Allergies   Allergen Reactions   • Aspirin    • Other      MRSA       Social History     Socioeconomic History   • Marital status: Single     Spouse name: Not on file   • Number of children: Not on file   • Years of education: Not on file   • Highest education level: Not on file   Social Needs   • Financial resource strain: Not on file   • Food insecurity - worry: Not on file   • Food insecurity - inability: Not on file   • Transportation needs - medical: Not on file   • Transportation needs - non-medical: Not on file   Occupational History   • Not on file   Tobacco Use   • Smoking status: Former Smoker   • Smokeless tobacco: Never  "Used   Substance and Sexual Activity   • Alcohol use: No   • Drug use: No   • Sexual activity: Defer   Other Topics Concern   • Not on file   Social History Narrative   • Not on file         Current Outpatient Medications   Medication Sig Dispense Refill   • albuterol (VENTOLIN HFA) 108 (90 BASE) MCG/ACT inhaler Inhale 2 puffs every 4 (four) hours as needed for wheezing. 8 g 11   • carisoprodol (SOMA) 350 MG tablet Take 1 tablet by mouth 3 (Three) Times a Day As Needed for Muscle Spasms. 90 tablet 2   • gabapentin (NEURONTIN) 300 MG capsule Take 1 capsule by mouth 3 (Three) Times a Day. 90 capsule 2   • vitamin D (ERGOCALCIFEROL) 10905 units capsule capsule Take 50,000 Units by mouth 1 (One) Time Per Week.       No current facility-administered medications for this visit.          OBJECTIVE    Pulse 101   Ht 170.2 cm (67\")   Wt 113 kg (250 lb)   SpO2 98%   BMI 39.16 kg/m²       Review of Systems   Constitutional: Negative.    HENT: Negative.    Eyes: Negative.    Respiratory: Negative.    Cardiovascular: Negative.    Gastrointestinal: Negative.    Genitourinary: Negative.    Musculoskeletal:        Bilateral ankle pain     Skin: Negative.    Neurological: Positive for numbness.   Psychiatric/Behavioral:        Depression       Physical Exam   Constitutional: She is oriented to person, place, and time. She appears well-developed and well-nourished. No distress.   HENT:   Head: Normocephalic and atraumatic.   Nose: Nose normal.   Eyes: Conjunctivae and EOM are normal. Pupils are equal, round, and reactive to light.   Pulmonary/Chest: Effort normal. No respiratory distress. She has no wheezes.   Neurological: She is alert and oriented to person, place, and time.   Skin: Skin is warm and dry. Capillary refill takes less than 2 seconds.   Psychiatric: She has a normal mood and affect. Her behavior is normal.   Vitals reviewed.    Lower Extremity:      Cardiovascular:    DP/PT pulses palpable    CFT brisk  to all " digits  Skin temp is warm to warm from proximal tibia to distal digits  Pedal hair growth present.     Musculoskeletal:  Anterior muscle group strength 3/5. Posterior group 4/5  Gait abducted, apropulsive  Semi-rigid hammertoe deformity toes 2-5 on right  Absent ROM on right ankle  Severe valgus of hindfoot with subluxation of STJ and TN  Tenderness with STJ ROM  Absent PTT function    Dermatological:   Skin is warm, dry and intact    No subcutaneous nodules or masses noted    No open wounds noted     Neurological:   Sensation intact to light touch          Procedures      ASSESSMENT AND PLAN    Karla was seen today for follow-up, pain, follow-up and pain.    Diagnoses and all orders for this visit:    Subtalar joint instability, right  -     Ambulatory Referral to Podiatry    Osteoarthritis of right ankle or foot  -     Ambulatory Referral to Podiatry    H/O ankle fusion  -     Ambulatory Referral to Podiatry    Degenerative joint disease, ankle, foot, toe, right  -     Ambulatory Referral to Podiatry    Right foot pain  -     Ambulatory Referral to Podiatry      - CT scan reviewed  - Rediscussed conservative and surgical treatment options with the patient in detail.  At this time I still recommend patient hold off on surgery as long as possible.  Ultimately she will need subtalar joint and talonavicular joint arthrodesis giving her a pantalar arthrodesis.  Recommended patient seek a second opinion.  - [facility]  - Continue custom AFOs.  We will consider PTBAFO in the future.   - All questions were answered to the patients satisfaction.  - Return to clinic as needed               This document has been electronically signed by Paul Greco DPM on February 28, 2019 5:39 PM     2/28/2019  5:39 PM

## 2020-07-14 ENCOUNTER — OFFICE VISIT (OUTPATIENT)
Dept: PODIATRY | Facility: CLINIC | Age: 40
End: 2020-07-14

## 2020-07-14 VITALS — HEIGHT: 67 IN | HEART RATE: 89 BPM | BODY MASS INDEX: 39.24 KG/M2 | WEIGHT: 250 LBS | OXYGEN SATURATION: 99 %

## 2020-07-14 DIAGNOSIS — Z98.1 H/O ANKLE FUSION: ICD-10-CM

## 2020-07-14 DIAGNOSIS — M25.571 ACUTE BILATERAL ANKLE PAIN: Primary | ICD-10-CM

## 2020-07-14 DIAGNOSIS — M25.374 SUBTALAR JOINT INSTABILITY, RIGHT: ICD-10-CM

## 2020-07-14 DIAGNOSIS — M25.572 ACUTE BILATERAL ANKLE PAIN: Primary | ICD-10-CM

## 2020-07-14 DIAGNOSIS — M19.071 OSTEOARTHRITIS OF RIGHT ANKLE OR FOOT: ICD-10-CM

## 2020-07-14 PROCEDURE — 99214 OFFICE O/P EST MOD 30 MIN: CPT | Performed by: PODIATRIST

## 2020-07-14 NOTE — PROGRESS NOTES
Karla Collins  1980  40 y.o. female     Patient presents today with a complaint of bilateral ankle pain. Xray obtained today     7/15/2020  Chief Complaint   Patient presents with   • Left Ankle - Pain   • Right Ankle - Pain       History of Present Illness    Pleasant 40-year-old female presents to clinic today for follow-up.  Is been approximately 1-1/2 years since her last visit.  On her last visit she requested second opinion for which she was provided a referral.  However, patient was not able to get second opinion.  Patient states that she had to move to [place] shortly after her last visit with myself.  She has recently moved back.  Continues to complain of pain and instability to the right ankle.  Does have history of ankle fusion several years ago by Dr. Moraes.  She does relate today to increasing pain to the left ankle and foot.  She does ambulate in bilateral custom AFOs.  These do help.  Currently she rates her pain as a 5 out of 10.  She remains interested in second opinion regarding treatment options for her right lower extremity.  No other complaints today.    Past Medical History:   Diagnosis Date   • Abdominal pain    • Acquired deformities     of ankle and foot   • Acute bronchitis     unspecified   • Backache    • Benign neoplasm     of skin of eyelid -EIC RLL margin   • Borderline glaucoma    • Cellulitis     right foot cellulitis   • Chronic back pain    • Chronic bronchitis (CMS/HCC)    • Chronic obstructive lung disease (CMS/HCC)     with asthma   • Chronic pain    • COAG (chronic open-angle glaucoma)     bleeding tests abnormal - blood clot      • Cyst of eye     eyelid   • Degenerative joint disease of ankle or foot    • Depressive disorder    • Diabetes mellitus (CMS/HCC)     screening   • Difficulty walking    • Edema    • Examination     Individual health examination      • Folliculitis    • Foot drop    • Foot pain    • Foreign body of skin of lower extremity     Foreign body of  skin of lower limb      • Fracture     ununited   • Gastroesophageal reflux disease    • Generalized anxiety disorder    • Hyperlipidemia    • Insomnia    • Localized primary osteoarthritis     of the ankle and /or foot   • Medication monitoring encounter    • Metatarsal bone fracture    • Morbid obesity (CMS/HCC)    • Myopia    • Obese    • Opioid dependence (CMS/HCC)    • Osteoarthrosis     localized, primary, involving ankle and foot   • Otitis media    • Pain     in right foot   • Pain in limb    • Pes planus    • Postcalcaneal bursitis    • Radiculitis    • Sinus tarsi syndrome    • Spasm of back muscles    • Thromboembolism (CMS/HCC)    • Tobacco dependence syndrome    • Tobacco user    • Venous thrombosis          Past Surgical History:   Procedure Laterality Date   • ABDOMINAL SURGERY  2009    L5 Vertebrectomy. Anterior fusion, L4 to sacrum. Local bone Mastercraft matrix and Infuse. Performed at [facility].)   • ANKLE SURGERY  2006    Arthrodesis of right ankle. Post traumatic degenerative joint disease    • BACK SURGERY  2009    (Posterior reduction of grade V spondylolisthesis and posteiror fusion with legacy instrumentation from L3 to the sacrum. Performed At [facility]   • BACK SURGERY      low back disk surgery x 2 most recent in 2009   •  SECTION      2003   • INJECTION OF MEDICATION  2015    kenalog(2) (Sinus tarsi syndrome)    • INJECTION OF MEDICATION  2015    Small Joint Injection/Aspiration  (Sinus tarsi syndrome   • OTHER SURGICAL HISTORY      Gastric band replacement, laparoscopic    • TUBAL ABDOMINAL LIGATION      (1)         Family History   Problem Relation Age of Onset   • Cancer Other    • Diabetes Other    • Heart disease Other    • Hypertension Other    • Stroke Other        Allergies   Allergen Reactions   • Aspirin    • Other      MRSA       Social History     Socioeconomic History   • Marital status: Single     Spouse name:  "Not on file   • Number of children: Not on file   • Years of education: Not on file   • Highest education level: Not on file   Tobacco Use   • Smoking status: Former Smoker   • Smokeless tobacco: Never Used   Substance and Sexual Activity   • Alcohol use: No   • Drug use: No   • Sexual activity: Defer         Current Outpatient Medications   Medication Sig Dispense Refill   • albuterol (VENTOLIN HFA) 108 (90 BASE) MCG/ACT inhaler Inhale 2 puffs every 4 (four) hours as needed for wheezing. 8 g 11   • carisoprodol (SOMA) 350 MG tablet Take 1 tablet by mouth 3 (Three) Times a Day As Needed for Muscle Spasms. 90 tablet 2   • gabapentin (NEURONTIN) 300 MG capsule Take 1 capsule by mouth 3 (Three) Times a Day. 90 capsule 2   • vitamin D (ERGOCALCIFEROL) 04709 units capsule capsule Take 50,000 Units by mouth 1 (One) Time Per Week.       No current facility-administered medications for this visit.          OBJECTIVE    Pulse 89   Ht 170.2 cm (67\")   Wt 113 kg (250 lb)   SpO2 99%   BMI 39.16 kg/m²       Review of Systems   Constitutional: Negative.    HENT: Negative.    Eyes: Negative.    Respiratory: Negative.    Cardiovascular: Negative.    Gastrointestinal: Negative.    Genitourinary: Negative.    Musculoskeletal:        Bilateral ankle pain     Skin: Negative.    Neurological: Positive for numbness.   Psychiatric/Behavioral:        Depression       Physical Exam   Constitutional: She is oriented to person, place, and time. She appears well-developed and well-nourished. No distress.   HENT:   Head: Normocephalic and atraumatic.   Nose: Nose normal.   Eyes: Pupils are equal, round, and reactive to light. Conjunctivae and EOM are normal.   Pulmonary/Chest: Effort normal. No respiratory distress. She has no wheezes.   Musculoskeletal: She exhibits edema, tenderness and deformity.   Neurological: She is alert and oriented to person, place, and time.   Skin: Skin is warm and dry. Capillary refill takes less than 2 seconds. "   Psychiatric: She has a normal mood and affect. Her behavior is normal.   Vitals reviewed.    Lower Extremity:      Cardiovascular:    DP/PT pulses palpable    CFT brisk  to all digits  Skin temp is warm to warm from proximal tibia to distal digits  Pedal hair growth present.   Musculoskeletal:  Gait abducted, apropulsive  Semi-rigid hammertoe deformity toes 2-5 on right  Absent ROM on right ankle  Severe valgus of hindfoot with subluxation of STJ and TN noted right   Tenderness with STJ ROM noted right   Absent PTT function right   Left ankle joint range of motion is within normal limits.  Dermatological:   Skin is warm, dry and intact    No subcutaneous nodules or masses noted    No open wounds noted   Neurological:   Sensation intact to light touch          Procedures      ASSESSMENT AND PLAN    Karla was seen today for pain and pain.    Diagnoses and all orders for this visit:    Acute bilateral ankle pain  -     Cancel: XR ankle 3+ vw bilateral; Future  -     XR ankle 3+ vw bilateral    Subtalar joint instability, right  -     Ambulatory Referral to Podiatry    Osteoarthritis of right ankle or foot  -     Ambulatory Referral to Podiatry    H/O ankle fusion  -     Ambulatory Referral to Podiatry      -Radiographs taken and reviewed.  - Rediscussed conservative and surgical treatment options going forward with the patient in detail.  Patient at minimum needs subtalar joint arthrodesis and talonavicular joint arthrodesis at minimum.  Patient wishes to obtain a second opinion.  - Referral to Dr. Ohara in [facility] for second opinion  - Continue custom AFOs.     - All questions were answered to the patients satisfaction.  - Return to clinic as needed    I spent 30 minutes face-to-face this patient discussing her pedal complaints and treatment options.             This document has been electronically signed by Paul Greco DPM on July 15, 2020 17:09     7/15/2020  17:09

## 2021-11-08 ENCOUNTER — OFFICE VISIT (OUTPATIENT)
Dept: PODIATRY | Facility: CLINIC | Age: 41
End: 2021-11-08

## 2021-11-08 VITALS — BODY MASS INDEX: 39.24 KG/M2 | HEIGHT: 67 IN | WEIGHT: 250 LBS

## 2021-11-08 DIAGNOSIS — M21.372 FOOT DROP, BILATERAL: ICD-10-CM

## 2021-11-08 DIAGNOSIS — Z98.1 H/O ANKLE FUSION: ICD-10-CM

## 2021-11-08 DIAGNOSIS — M21.371 FOOT DROP, BILATERAL: ICD-10-CM

## 2021-11-08 DIAGNOSIS — M25.374 SUBTALAR JOINT INSTABILITY, RIGHT: Primary | ICD-10-CM

## 2021-11-08 DIAGNOSIS — M19.071 OSTEOARTHRITIS OF RIGHT ANKLE OR FOOT: ICD-10-CM

## 2021-11-08 PROCEDURE — 99213 OFFICE O/P EST LOW 20 MIN: CPT | Performed by: PODIATRIST

## 2021-11-08 RX ORDER — GABAPENTIN 400 MG/1
400 CAPSULE ORAL 4 TIMES DAILY
COMMUNITY
Start: 2021-10-28

## 2021-11-08 NOTE — PROGRESS NOTES
Karlajolly Ameses  1980  41 y.o. female     Patient presents today with post op braces     11/8/2021  Chief Complaint   Patient presents with   • Left Foot - Braces   • Right Foot - braces       History of Present Illness    41-year-old female presents to clinic today for follow-up.  She is requesting a prescription for new braces.  She states that her current braces are worn out.  On her last visit she was referred to [place] for a second opinion.  Patient states that she never had that appointment for second opinion.  She denies any new complaints today.    Past Medical History:   Diagnosis Date   • Abdominal pain    • Acquired deformities     of ankle and foot   • Acute bronchitis     unspecified   • Backache    • Benign neoplasm     of skin of eyelid -EIC RLL margin   • Borderline glaucoma    • Cellulitis     right foot cellulitis   • Chronic back pain    • Chronic bronchitis (HCC)    • Chronic obstructive lung disease (HCC)     with asthma   • Chronic pain    • COAG (chronic open-angle glaucoma)     bleeding tests abnormal - blood clot      • Cyst of eye     eyelid   • Degenerative joint disease of ankle or foot    • Depressive disorder    • Diabetes mellitus ([facility])     screening   • Difficulty walking    • Edema    • Examination     Individual health examination      • Folliculitis    • Foot drop    • Foot pain    • Foreign body of skin of lower extremity     Foreign body of skin of lower limb      • Fracture     ununited   • Gastroesophageal reflux disease    • Generalized anxiety disorder    • Hyperlipidemia    • Insomnia    • Localized primary osteoarthritis     of the ankle and /or foot   • Medication monitoring encounter    • Metatarsal bone fracture    • Morbid obesity ([facility])    • Myopia    • Obese    • Opioid dependence ([facility])    • Osteoarthrosis     localized, primary, involving ankle and foot   • Otitis media    • Pain     in right foot   • Pain in limb    • Pes planus    • Postcalcaneal bursitis    •  Radiculitis    • Sinus tarsi syndrome    • Spasm of back muscles    • Thromboembolism (HCC)    • Tobacco dependence syndrome    • Tobacco user    • Venous thrombosis          Past Surgical History:   Procedure Laterality Date   • ABDOMINAL SURGERY  2009    L5 Vertebrectomy. Anterior fusion, L4 to sacrum. Local bone [employer] matrix and Infuse. Performed at [facility].)   • ANKLE SURGERY  2006    Arthrodesis of right ankle. Post traumatic degenerative joint disease    • BACK SURGERY  2009    (Posterior reduction of grade V spondylolisthesis and posteiror fusion with legacy instrumentation from L3 to the sacrum. Performed At [facility]   • BACK SURGERY      low back disk surgery x 2 most recent in 2009   •  SECTION      2003   • INJECTION OF MEDICATION  2015    kenalog(2) (Sinus tarsi syndrome)    • INJECTION OF MEDICATION  2015    Small Joint Injection/Aspiration  (Sinus tarsi syndrome   • OTHER SURGICAL HISTORY      Gastric band replacement, laparoscopic    • TUBAL ABDOMINAL LIGATION      (1)         Family History   Problem Relation Age of Onset   • Cancer Other    • Diabetes Other    • Heart disease Other    • Hypertension Other    • Stroke Other        Allergies   Allergen Reactions   • Amitriptyline Other (See Comments)     Convulsions / seizures    • Aspirin    • Other      MRSA       Social History     Socioeconomic History   • Marital status: Single   Tobacco Use   • Smoking status: Former Smoker   • Smokeless tobacco: Never Used   Vaping Use   • Vaping Use: Never used   Substance and Sexual Activity   • Alcohol use: No   • Drug use: No   • Sexual activity: Defer         Current Outpatient Medications   Medication Sig Dispense Refill   • albuterol (VENTOLIN HFA) 108 (90 BASE) MCG/ACT inhaler Inhale 2 puffs every 4 (four) hours as needed for wheezing. 8 g 11   • carisoprodol (SOMA) 350 MG tablet Take 1 tablet by mouth 3 (Three) Times a Day As  "Needed for Muscle Spasms. 90 tablet 2   • gabapentin (NEURONTIN) 400 MG capsule Take 400 mg by mouth 4 (Four) Times a Day.       No current facility-administered medications for this visit.         OBJECTIVE    Ht 170.2 cm (67\")   Wt 113 kg (250 lb)   BMI 39.16 kg/m²       Review of Systems   Constitutional: Negative.    HENT: Negative.    Eyes: Negative.    Respiratory: Negative.    Cardiovascular: Negative.    Gastrointestinal: Negative.    Genitourinary: Negative.    Musculoskeletal:        Bilateral ankle pain     Skin: Negative.    Neurological: Positive for numbness.   Psychiatric/Behavioral:        Depression       Physical Exam   Constitutional: She is oriented to person, place, and time. She appears well-developed. No distress.   HENT:   Head: Normocephalic and atraumatic.   Nose: Nose normal.   Eyes: Pupils are equal, round, and reactive to light. Conjunctivae are normal.   Pulmonary/Chest: Effort normal. No respiratory distress. She has no wheezes.   Musculoskeletal: Tenderness and deformity present.   Neurological: She is alert and oriented to person, place, and time.   Skin: Skin is warm and dry. Capillary refill takes less than 2 seconds.   Psychiatric: Her behavior is normal.   Vitals reviewed.    Lower Extremity:      Cardiovascular:    DP/PT pulses palpable    CFT brisk  to all digits  Skin temp is warm to warm from proximal tibia to distal digits  Pedal hair growth present.   Musculoskeletal:  Gait abducted, apropulsive  Semi-rigid hammertoe deformity toes 2-5 on right  Absent ROM on right ankle  Severe valgus of hindfoot with subluxation of STJ and TN noted right   Tenderness with STJ ROM noted right   Absent PTT function right   Left ankle joint range of motion is within normal limits.  Dermatological:   Skin is warm, dry and intact    No subcutaneous nodules or masses noted    No open wounds noted   Neurological:   Sensation intact to light touch          Procedures      ASSESSMENT AND " PLAN    Diagnoses and all orders for this visit:    1. Subtalar joint instability, right (Primary)    2. Osteoarthritis of right ankle or foot    3. H/O ankle fusion    4. Foot drop, bilateral      -Patient examined and evaluated.  -Rx for new custom AFOs.  Patient's current AFOs are worn out.  She would benefit from a new set.  These custom AFOs to help patient to ambulate with decreased pain.  - All questions were answered to the patients satisfaction.  - Return to clinic as needed               This document has been electronically signed by Paul Greco DPM on November 8, 2021 12:39 CST     11/8/2021  12:39 CST